# Patient Record
(demographics unavailable — no encounter records)

---

## 2025-01-22 NOTE — PLAN
[FreeTextEntry1] :  Gender Dysphoria: discussed estradiol above goal level of 100-200 and potential for ADRs with levels this high (stroke, etc.). Difficult to interpret level with same day injection, patient will repeat blood work for mid cycle level 3-4 days after injection. Will order routine blood work and f/u results to patient once made available - will call with results and dose adjustment if needed. F/u 3 months. Advised patient to call with any questions or concerns. Patient verbalized understanding.   Obesity/DM/Hepatic Steatosis: stable off medications and s/p bariatric surgery, liver enzymes and A1c improved. Continue care under bariatric surgeon/gastro. Advised patient to call with any questions or concerns. Patient verbalized understanding.   Vitamin d def: stable on supplement, continue as prescribed. Advised patient to call with any questions or concerns. Patient verbalized understanding.

## 2025-01-22 NOTE — HEALTH RISK ASSESSMENT
[Yes] : Yes [Monthly or less (1 pt)] : Monthly or less (1 point) [3 or 4 (1 pt)] : 3 or 4  (1 point) [Less than monthly (1 pt)] : Less than monthly (1 point) [No] : In the past 12 months have you used drugs other than those required for medical reasons? No [0] : 2) Feeling down, depressed, or hopeless: Not at all (0) [PHQ-2 Negative - No further assessment needed] : PHQ-2 Negative - No further assessment needed [Audit-CScore] : 3 [[id number]] : 0

## 2025-01-22 NOTE — HISTORY OF PRESENT ILLNESS
[Home] : at home, [unfilled] , at the time of the visit. [Medical Office: ([facility])___] : at the medical office located in  [Verbal consent obtained from patient] : the patient, [unfilled] [FreeTextEntry1] : f/u [de-identified] : Patient here today for f/u  Gender Dysphoria: started DIY gaht 2014 in [facility], she first saw [facility] in 2020 in . She is feeling good on current regimen no issues. 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 She reports doing well since having surgery. She is down to 150 pounds. She has had some increased gas and occasional constipation but has otherwise been doing well  Sexual Hx: Relationship status: in relationship long distance  Partners (tell me about the genders and bodies of partners, any sperm producing partners): cis men Practices (types of sex, monogamous/polyamorous): vaginal, oral, anal Protections from STIs (condoms, OCP, LARC, PrEP, etc.): not using condoms since monogamous Past Hx of STIs: none Pregnancy (Desire or Hx): n/a LMP: n/a  Patient reports 0 sexual partners in the last 3 months. Last sexual encounter was dec when visiting boyfriend in   Pt denies any SOB, cough, chest pain, palpitations, D/C, fever, or chills. No other health concerns today.